# Patient Record
Sex: MALE | Race: WHITE | URBAN - METROPOLITAN AREA
[De-identification: names, ages, dates, MRNs, and addresses within clinical notes are randomized per-mention and may not be internally consistent; named-entity substitution may affect disease eponyms.]

---

## 2019-01-23 ENCOUNTER — HISTORICAL (OUTPATIENT)
Dept: RADIOLOGY | Facility: HOSPITAL | Age: 29
End: 2019-01-23

## 2019-01-29 ENCOUNTER — HISTORICAL (OUTPATIENT)
Dept: ADMINISTRATIVE | Facility: HOSPITAL | Age: 29
End: 2019-01-29

## 2020-07-30 ENCOUNTER — HISTORICAL (OUTPATIENT)
Dept: ADMINISTRATIVE | Facility: HOSPITAL | Age: 30
End: 2020-07-30

## 2020-07-30 LAB — SARS-COV-2 RNA RESP QL NAA+PROBE: NOT DETECTED

## 2021-06-16 ENCOUNTER — HISTORICAL (OUTPATIENT)
Dept: ADMINISTRATIVE | Facility: HOSPITAL | Age: 31
End: 2021-06-16

## 2021-07-01 ENCOUNTER — HISTORICAL (OUTPATIENT)
Dept: RADIOLOGY | Facility: HOSPITAL | Age: 31
End: 2021-07-01

## 2021-07-15 ENCOUNTER — HISTORICAL (OUTPATIENT)
Dept: ADMINISTRATIVE | Facility: HOSPITAL | Age: 31
End: 2021-07-15

## 2021-07-15 LAB — SARS-COV-2 AG RESP QL IA.RAPID: NEGATIVE

## 2021-07-16 ENCOUNTER — HISTORICAL (OUTPATIENT)
Dept: SURGERY | Facility: HOSPITAL | Age: 31
End: 2021-07-16

## 2021-08-23 ENCOUNTER — HISTORICAL (OUTPATIENT)
Dept: ADMINISTRATIVE | Facility: HOSPITAL | Age: 31
End: 2021-08-23

## 2021-08-23 LAB — SARS-COV-2 RNA RESP QL NAA+PROBE: NOT DETECTED

## 2021-09-27 ENCOUNTER — HISTORICAL (OUTPATIENT)
Dept: ADMINISTRATIVE | Facility: HOSPITAL | Age: 31
End: 2021-09-27

## 2021-12-27 ENCOUNTER — HISTORICAL (OUTPATIENT)
Dept: LAB | Facility: HOSPITAL | Age: 31
End: 2021-12-27

## 2022-04-11 ENCOUNTER — HISTORICAL (OUTPATIENT)
Dept: ADMINISTRATIVE | Facility: HOSPITAL | Age: 32
End: 2022-04-11

## 2022-04-24 VITALS
WEIGHT: 155.44 LBS | DIASTOLIC BLOOD PRESSURE: 52 MMHG | BODY MASS INDEX: 21.05 KG/M2 | SYSTOLIC BLOOD PRESSURE: 150 MMHG | HEIGHT: 72 IN

## 2022-04-30 NOTE — OP NOTE
DATE OF SURGERY:    01/29/2019    SURGEON:  Jun Vyas MD    PREOPERATIVE DIAGNOSIS:  Pilonidal cyst with abscess.    POSTOPERATIVE DIAGNOSIS:  Pilonidal cyst with abscess.    PROCEDURE:  Excision of pilonidal cyst and sinus, extensive.    ANESTHESIA:  General.    ESTIMATED BLOOD LOSS:  30 cc.    SPECIMENS:  None.    COMPLICATIONS:  None.    PROCEDURE IN DETAIL:  After informed consent was obtained, the patient was brought to the operating room and placed in a supine position.  Next, general endotracheal anesthesia was administered by a member of the anesthesia team.  The patient was placed in the prone jackknife position.  Buttocks were taped apart for exposure.  The perianal area and presacral area were prepped and draped in sterile surgical fashion.  The patient had a small right posterior lateral perianal opening with hypergranulation tissue.  A small Hill-Ch retractor was inserted into the rectum, and all 4 quadrants inspected.  No pathology was identified.  A lacrimal probe was then placed into the external opening; however, it did not track toward the rectum at all.  Peroxide was injected and again no internal opening or fistula was identified.  The opening was then probed towards the superior gluteal cleft and noted to communicate with a small midline pit.  The pit and fistulous opening were excised sharply with a 15 blade and electrocautery.  Dense scarring was noted within the base of the wound, as well as chronic granulation tissue.  The granulation tissue was debrided with a curette, and the scar tissue was excised with electrocautery.  The large right gluteal abscess was then incised laterally with a 15 blade.  Thin purulence was drained.  This was then injected with peroxide and noted to communicate with the superior aspect of the pilonidal wound.  A probe was then placed through this sinus tract, which was then opened with the scalpel and electrocautery as well.  Again, chronic scarring  and granulation tissue was encountered.  This was debrided and excised.  The lateral incision over the right buttock was extended and this abscess cavity was debrided with a curette in order to remove the chronic granulation tissue.  All wounds were irrigated with copious amounts of peroxide and saline.  Hemostasis was achieved with spot cautery.  A 1/4 inch Penrose drain was placed through the left lateral buttock incision through the abscess cavity and sinus tract.  It was tied to itself with 0 silk suture.  The abscess cavity as well as the midline pilonidal wound were packed with 2 inch Iodoform gauze and covered with a dry dressing.  The patient tolerated the procedure well and there were no complications.  He was returned to the supine position.  He was awakened and extubated in the operating room, then subsequently transferred to recovery in satisfactory condition.        ______________________________  Jun Vyas MD    MH/UD  DD:  01/29/2019  Time:  10:10AM  DT:  01/29/2019  Time:  10:32AM  Job #:  [id number]

## 2022-04-30 NOTE — OP NOTE
Patient:   Orestes Gabriel            MRN: [id number]            FIN: [id number]               Age:   30 years     Sex:  Male     :  1990   Associated Diagnoses:   None   Author:   Rudy Lima Jr, MD      Surgeon: Rudy Lima MD  PREOPERATIVE DIAGNOSIS: Right knee anterior cruciate ligament tear, medial meniscus tear, lateral meniscus tear  POSTOPERATIVE DIAGNOSIS: Right knee anterior cruciate ligament tear, medial and lateral meniscus tear  PROCEDURES: Right knee arthroscopic anterior cruciate ligament reconstruction with hamstring autograft, right knee arthroscopic medial and lateral meniscus repair  ASSISTANT: BREANN Lara.  Ms. Fu was essential in manipulating the leg, retraction, graft preparation and shuttling, and closure  COMPLICATIONS: None.   DISPOSITION: Home.   IMPLANT: Arthrex  HISTORY OF PRESENT ILLNESS: Orestes is a pleasant 30-year-old, who has had a multiple injuries to his knee with a subsequent feelings of instability.  Physical exam and MRI confirmed ACL tear.  We recommended reconstruction to prevent instability and pain. Risks, benefits and alternatives therapy were presented to the patient, and they elected to proceed.   PROCEDURE: Orestes was initially seen in the preoperative area where the history and physical were reviewed without changes.  Right lower extremity was marked. Consents were reviewed. All questions were answered. Anesthesia performed a preoperative block to the lower extremity. The patient was then transferred to the operating room, placed supine on the operating table where general anesthesia was induced. The right lower extremity was prepped and draped in sterile fashion. A nonsterile tourniquet was placed. It was insufflated to 300 mmHg and we began our procedure.   We made an vertical incision along the course of the hamstring tendons. We sharply dissected the skin and subcutaneous tissue. We lifted a medial L flap off the tibia and identified  the semitendinosus and gracilis tendons. We freed these of adhesions and harvested these consecutively using a closed tendon stripper. We then brought the tendons in the back table where we prepped them.   We initially cleaned the gracillis of excess muscle. We whipstitched the other end. We then transferred it to vancomycin soak gauge. We then measured the semitendinosus tendon. The semitendinosus tendon was stripped it of its muscle, and we again whipstitched the other end.  The semitendinosus was 320 mm so was able to triple this graft.  We then measured the diameter of the tendons and there were 9 mm. We then placed both tendons on the graft table and performed our #0 Vicryl whipstitch of the tendons. We marked the central portion of the tendon and then 35 mm to either side of the central carrie. We created a Saurabh sandal type stitch around the tendons. We then retensioned tendons on the back table, covered them with vancomycin soak gauze and returned to the arthroscopy portion.   We began the diagnostic arthroscopy. The patellofemoral joint was okay. There was no loose bodies or plicas. The medial and lateral gutters were clean. We then established an anterior medial portal using a spinal needle. The medial compartment had a no cartilage damage. The medial meniscus had a a vertical tear along the posterior horn.  I used a rasp in order to freshen the tear.  I then placed a suture across the tear in order to stabilize this.  I did this using an all inside technique.. . We then turned our attention to the lateral compartment. The lateral femoral condyle and plateau were without cartilage damage. The lateral meniscus again had a a vertical tear of the posterior horn.  This was just posterior to the popliteus.  I again placed a suture across the tear in order to repair it.  I again used a all inside technique.  We then turned out attention to the notch. The ACL was torn. We debrided the scarred ACL remnant with a  biter and shaver. We then began our tunnel preparation.   We used the accessory medial portal to access the lateral femoral condyle. We used a aiming guide to place our femoral pin. We drove this pin partially out of the lateral aspect of the knee. We then over reamed with an 9 mm partially threaded reamer to 25 mm. We then pulled the Beath pin out of the lateral aspect of the knee while shuttling a #5 permanent suture. We then turned our attention to the tibial tunnel. We used a elbow ACL guide placed at 55 degrees into the ACL tibial footprint. We placed our 3/32 pin and then reamed with a fully threaded 9.5 mm reamer. We cleaned the knee of all debris with a shaver. We then shuttled the shuttling suture from the femur down to the tibial tunnel. We then passed our graft up into the femur. We used a cortical button to secure the graft on the femoral side. We used a 10 mm soft tissue screw to secure the tibia.  I reinforced my tibia fixation using a swivel lock. The extraneaous tendon and sutures were removed. The wounds were copiously irrigated.   We closed the tibial incision layers starting with a #1 interrupted Vicryl on the hamstring flap. The subcutaneous tissue was closed with 2-0 Monocryl and the skin and portals were closed with 3-0 Monocryl suture.  A sterile dressing was applied.  A hinged knee brace was applied.  The patient was awoken unremarkably from anesthesia and transferred back to the postoperative unit.

## 2022-05-05 NOTE — HISTORICAL [facility] CERNER
This is a historical note converted from Radha. Formatting and pictures may have been removed.  Please reference Radha for original formatting and attached multimedia. Chief Complaint  right knee dislocation of patella DOI 2-25-21 then again 2 weeks ago..no prior surgery  History of Present Illness  He is a pleasant 31yo who presents with right knee pain and instability since February 2021. He had a subsequent dislocation in June 2021. He has associated swelling and pain that resolves after a few days. He feels some instability in his knee and wears a brace with some relief. Pain is located suprapatellar. He has tried OTC medications with some relief.? He is tried a home directed exercise program without relief since February 2021  Review of Systems  Comprehensive review of system was performed with no exceptions other than noted in the history of present illness  Physical Exam  Vitals & Measurements  T:?36.2? ?C (Oral)? HR:?75(Apical)? BP:?125/59?  HT:?182.00?cm? WT:?68.000?kg? BMI:?20.53?  Gen: WN, WD, NAD  Card/Res: NL breathing, +distal pulses  Abdomen: ND  Standing exam  stance: normal alignment, no significant leg-length discrepancy  gait:?normal  ?   Knee examination  - General comments: unremarkable appearance  ?   - Tenderness: suprapatellar  ?   Knee???????????????RIGHT??????????LEFT  Skin:??????????????? Intact ??????????Intact  ROM:??????????????? 0-130??????????0-130  Effusion:??????????? +???????????? Neg  MJL TTP:?????????? Neg?????????? ?? Neg  LJL TTP:????????????Neg?????????? ?? Neg  Turner:???????? ?Neg???????????? Neg  Pat crep:??????????? Neg???????????? Neg  Patella TTPs:????? Neg?????????????Neg  Patella grind:????? Neg??????????? ?Neg  Lachman:???????????+???????????? Neg  Pivot shift: ?????? ??Guards ? ? ? ? ? ?neg  Valgus stress:???? ?Neg??????? ???Neg  Varus stress:?????? Neg????????????Neg  Posterior drawer: Neg????????? ??Neg  ?   N-V????????????????intact?????????????  intact  Hip:?????????????????nml?????????????? nml  ?   Lower extremity edema:Negative??  Assessment/Plan  1.?ACL tear?S83.519A  ?Concern for ACL tear. ?Will get MRI to evaluate  Ordered:  Clinic Follow up, *Est. 06/23/21 3:00:00 CDT, Order for future visit, ACL tear, [facility]  MRI Ext Lower Joint Right W/O Contrast, Routine, 06/16/21 9:47:00 CDT, Other (please specify), Knee trauma, internal derangement suspected, xray done, None, Ambulatory, Patient Has IV?, Rad Type, Order for future visit, ACL tear, Schedule this test, [facility], 06/16/21 9:47:...  Office/Outpatient Visit Level 3 New [id number] PC, ACL tear, [facility], 06/16/21 9:47:00 CDT  ?  Orders:  XR Knee Right 4 or More Views, Routine, 06/16/21 8:57:00 CDT, None, Ambulatory, Patient Has IV?, Rad Type, Right knee dislocation, Not Scheduled, 06/16/21 8:57:00 CDT   Problem List/Past Medical History  Ongoing  Acute sprain or strain of thoracic region  Historical  No qualifying data  Procedure/Surgical History  Repair Upper Lip, External Approach (02/09/2020)  Simple repair of superficial wounds of face, ears, eyelids, nose, lips and/or mucous membranes; 2.5 cm or less (02/09/2020)  Excision of Buttock Subcutaneous Tissue and Fascia, Open Approach (01/29/2019)  Excision of pilonidal cyst or sinus; extensive (01/29/2019)  Incision & Drainage Major (.) (01/29/2019)   Medications  Mobic 7.5 mg oral tablet, 7.5 mg= 1 tab(s), Oral, BID, PRN,? ?Not Taking, Completed Rx: Last Dose Date/Time Unknown  Allergies  No Known Allergies  Social History  Abuse/Neglect  No, 06/16/2021  Alcohol - Denies Alcohol Use, 08/01/2014  Substance Use - Denies Substance Abuse, 08/01/2014  Tobacco - Denies Tobacco Use, 08/01/2014  10 or more cigarettes (1/2 pack or more)/day in last 30 days, No, 06/16/2021  Immunizations  Vaccine Date Status   tetanus/diphtheria/pertussis, acel(Tdap) 02/09/2020 Given   Health Maintenance  Health Maintenance  ???Pending?(in the  next year)  ??? ??OverDue  ??? ? ? ?TB Skin Test due??08/17/20??and every 1??year(s)  ??? ? ? ?Influenza Vaccine due??10/01/20??and every 1??day(s)  ??? ??Due?  ??? ? ? ?ADL Screening due??06/16/21??and every 1??year(s)  ??? ? ? ?Depression Screening due??06/16/21??Unknown Frequency  ??? ??Due In Future?  ??? ? ? ?Obesity Screening not due until??01/01/22??and every 1??year(s)  ??? ? ? ?Smoking Cessation not due until??01/01/22??and every 1??year(s)  ??? ? ? ?Alcohol Misuse Screening not due until??01/02/22??and every 1??year(s)  ???Satisfied?(in the past 1 year)  ??? ??Satisfied?  ??? ? ? ?Alcohol Misuse Screening on??06/16/21.??Satisfied by Christa Guerrero LPN  ??? ? ? ?Blood Pressure Screening on??06/16/21.??Satisfied by Christa Guerrero LPN  ??? ? ? ?Body Mass Index Check on??06/16/21.??Satisfied by Christa Guerrero LPN  ??? ? ? ?Obesity Screening on??06/16/21.??Satisfied by Christa Guerrero LPN  ??? ? ? ?Smoking Cessation on??06/16/21.??Satisfied by Christa Guerrero LPN  ?  Diagnostic Results  Knee radiographs show normal bony alignment

## 2022-05-05 NOTE — HISTORICAL [facility] CERNER
This is a historical note converted from Radha. Formatting and pictures may have been removed.  Please reference Radha for original formatting and attached multimedia. Chief Complaint  10 week s/p Rt knee ACL Recon, MM/LM rep here for eval and xrays. Doing great. ? sx 7/16/21 ?gl. 10/14/21.  History of Present Illness  7/16/2021: Right knee arthroscopic ACL reconstruction with hamstring autograft,?medial and lateral meniscus repair  ?   He returns today. He does not have any pain. ?He is working with therapy. ?Does have still some weakness around the knee.  Physical Exam  Vitals & Measurements  HR:?87(Peripheral)? BP:?150/52?  HT:?182.88?cm? WT:?70.500?kg? BMI:?21.08?  His incisions healed. ?Range of motion full. ?Expected quad atrophy. ?Stable ligamentously  Assessment/Plan  1.?S/P ACL reconstruction?Z98.890  ?Doing great status post above.? He can?begin straight line jogging and running. ?Continue strengthening.??He will be ready to get back to work?without restrictions on October 18.? I provided him with paperwork today. ?I will see him back in 3 months for range of motion check?and?plan ACL testing  Ordered:  Clinic Follow up, *Est. 12/27/21 3:00:00 CST, Order for future visit, S/P ACL reconstruction, [facility]  Post-Op follow-up visit [id number] PC, S/P ACL reconstruction, [facility], 09/27/21 9:36:00 CDT  XR Knee Right 3 Views, Routine, 09/27/21 9:24:00 CDT, None, Ambulatory, Patient Has IV?, Rad Type, S/P arthroscopic surgery of right knee, Not Scheduled, 09/27/21 9:24:00 CDT  ?  Referrals  Clinic Follow up, *Est. 12/27/21 3:00:00 CST, Order for future visit, S/P ACL reconstruction, [facility]   Problem List/Past Medical History  Ongoing  Acute sprain or strain of thoracic region  S/P ACL reconstruction  Historical  No qualifying data  Procedure/Surgical History  Arthroscopy Multi Ligament Knee Reconstruction (Right) (07/16/2021)  Arthroscopy, knee, surgical; with meniscus repair (medial AND  lateral) (07/16/2021)  Repair Right Knee Joint, Percutaneous Endoscopic Approach (07/16/2021)  Repair, primary, torn ligament and/or capsule, knee; cruciate (07/16/2021)  Replacement of Right Knee Bursa and Ligament with Autologous Tissue Substitute, Open Approach (07/16/2021)  Repair Upper Lip, External Approach (02/09/2020)  Simple repair of superficial wounds of face, ears, eyelids, nose, lips and/or mucous membranes; 2.5 cm or less (02/09/2020)  Excision of Buttock Subcutaneous Tissue and Fascia, Open Approach (01/29/2019)  Excision of pilonidal cyst or sinus; extensive (01/29/2019)  Incision & Drainage Major (.) (01/29/2019)  closed reduction lt wist   Medications  ketorolac, 30 mg= 1 mL, Intra-Articular, Once  Naropin 0.5% injectable solution, 150 mg= 30 mL, Intra-Articular, Once  Allergies  No Known Allergies  Social History  Abuse/Neglect  No, No, Yes, 09/27/2021  Alcohol - Denies Alcohol Use, 08/01/2014  Substance Use - Denies Substance Abuse, 08/01/2014  Tobacco - Denies Tobacco Use, 08/01/2014  10 or more cigarettes (1/2 pack or more)/day in last 30 days, No, 09/27/2021  Immunizations  Vaccine Date Status   tetanus/diphtheria/pertussis, acel(Tdap) 02/09/2020 Given   Health Maintenance  Health Maintenance  ???Pending?(in the next year)  ??? ??OverDue  ??? ? ? ?TB Skin Test due??08/17/20??and every 1??year(s)  ??? ??Due?  ??? ? ? ?ADL Screening due??09/27/21??and every 1??year(s)  ??? ? ? ?Depression Screening due??09/27/21??Unknown Frequency  ??? ??Due In Future?  ??? ? ? ?Obesity Screening not due until??01/01/22??and every 1??year(s)  ??? ? ? ?Smoking Cessation not due until??01/01/22??and every 1??year(s)  ??? ? ? ?Alcohol Misuse Screening not due until??01/02/22??and every 1??year(s)  ??? ? ? ?Blood Pressure Screening not due until??07/12/22??and every 1??year(s)  ??? ? ? ?Body Mass Index Check not due until??08/16/22??and every 1??year(s)  ???Satisfied?(in the past 1 year)  ??? ??Satisfied?  ??? ? ?  ?Alcohol Misuse Screening on??06/16/21.??Satisfied by Christa Guerrero LPN  ??? ? ? ?Blood Pressure Screening on??09/27/21.??Satisfied by Samantha Phillip  ??? ? ? ?Body Mass Index Check on??09/27/21.??Satisfied by Samantha Phillip L.  ??? ? ? ?Influenza Vaccine on??09/27/21.??Satisfied by Samantha Phillip L.  ??? ? ? ?Obesity Screening on??09/27/21.??Satisfied by Samantha Phillip LChino L.  ??? ? ? ?Smoking Cessation on??06/16/21.??Satisfied by Christa Guerrero LPN  ?  Diagnostic Results  Knee radiographs show appropriate implant position      Due to his quad atrophy and lack of strength, he has continued instability of his knee. I will provide him with an ACL brace today to stabilize his knee as he returns to work.

## 2024-06-20 ENCOUNTER — TELEPHONE (OUTPATIENT)
Dept: FAMILY MEDICINE | Facility: CLINIC | Age: 34
End: 2024-06-20

## 2024-06-20 ENCOUNTER — OFFICE VISIT (OUTPATIENT)
Dept: FAMILY MEDICINE | Facility: CLINIC | Age: 34
End: 2024-06-20
Payer: OTHER GOVERNMENT

## 2024-06-20 VITALS
OXYGEN SATURATION: 97 % | SYSTOLIC BLOOD PRESSURE: 144 MMHG | TEMPERATURE: 98 F | WEIGHT: 190.63 LBS | HEIGHT: 72 IN | RESPIRATION RATE: 18 BRPM | HEART RATE: 80 BPM | DIASTOLIC BLOOD PRESSURE: 98 MMHG | BODY MASS INDEX: 25.82 KG/M2

## 2024-06-20 DIAGNOSIS — Z00.00 ENCOUNTER FOR WELLNESS EXAMINATION: ICD-10-CM

## 2024-06-20 DIAGNOSIS — Z13.220 ENCOUNTER FOR LIPID SCREENING FOR CARDIOVASCULAR DISEASE: ICD-10-CM

## 2024-06-20 DIAGNOSIS — I10 PRIMARY HYPERTENSION: Primary | ICD-10-CM

## 2024-06-20 DIAGNOSIS — Z13.1 SCREENING FOR DIABETES MELLITUS: ICD-10-CM

## 2024-06-20 DIAGNOSIS — Z13.6 ENCOUNTER FOR LIPID SCREENING FOR CARDIOVASCULAR DISEASE: ICD-10-CM

## 2024-06-20 RX ORDER — VALSARTAN 40 MG/1
40 TABLET ORAL DAILY
Qty: 30 TABLET | Refills: 0 | Status: SHIPPED | OUTPATIENT
Start: 2024-06-20 | End: 2025-06-20

## 2024-06-20 RX ORDER — OLMESARTAN MEDOXOMIL 20 MG/1
20 TABLET ORAL DAILY
Qty: 30 TABLET | Refills: 0 | Status: SHIPPED | OUTPATIENT
Start: 2024-06-20 | End: 2024-06-20

## 2024-06-20 NOTE — TELEPHONE ENCOUNTER
Pts insurance will not cover the Olmesartan. Per Thelma pt needs to try one of the following meds first. Please advise.    Smart Rules : The preferred medications in the renin angiotensin antihypertensive (RAA) class are: Cozaar, Hyzaar, Diovan, Diovan HCT, Exforge, Exforge HCT, Micardis, Micardis HCT, Twynsta, Avalide and their generic equivalents. Has the patient had a trial of one preferred RAA agent and was unable to tolerate treatment due to adverse effects? -- PLEASE NOTE: the generic equivalents are: Cozaar = losartan. Hyzaar = losartan + HCTZ. Diovan = valsartan. Diovan HCT = valsartan + HCTZ. Exforge = amlodipine + valsartan. Exforge HCT = amlodipine + valsartan + HCTZ. Micardis = telmisartan. Micardis HCT = telmisartan + HCTZ. Twynsta = telmisartan + amlodipine. Avapro = irbesartan. Avalide = irbesartan + HCTZ. Atacand = candesartan. Atacand HCT = candesartan + HCTZ.

## 2024-06-20 NOTE — PROGRESS NOTES
Orestes Sanzmillion  06/20/2024  [id number]    Amalia Levy MD  Patient Care Team:  Amalia Levy MD as PCP - General (Family Medicine)      Chief Complaint:  Chief Complaint   Patient presents with    Establish Care     Needs PCP-pt state that he has elevated blood pressure at times       History of Present Illness:    33 y.o. male who presents today to establish care. He reports that he has been having elevated blood pressure readings for years. He is in the  and usually fluctuates into the 150's. Dad was diagnosed with htn in his 30's. He does have occasional palpitations. He is otherwise asymptomatic.     Review of Systems  General: denies f/c, weight loss, night sweats, decreased appetite  Eye: denies blurred vision, changes in vision  Respiratory: denies sob, wheezing, cough  Cardiovascular: denies chest pain, edema  Integumentary: denies rashes, pruritis    Past Medical History  History reviewed. No pertinent past medical history.    Medications  Medication List with Changes/Refills   New Medications    OLMESARTAN (BENICAR) 20 MG TABLET    Take 1 tablet (20 mg total) by mouth once daily.       Past Surgical History:   Procedure Laterality Date    KNEE ARTHROSCOPY      SURGICAL REMOVAL OF PILONIDAL CYST         SUBJECTIVE:  Health Maintenance  Health Maintenance Topics with due status: Not Due       Topic Last Completion Date    TETANUS VACCINE 02/09/2020     Health Maintenance Due   Topic Date Due    Hepatitis C Screening  Never done    Lipid Panel  Never done    HIV Screening  Never done    COVID-19 Vaccine (2 - 2023-24 season) 09/01/2023       Exam:  Vitals:    06/20/24 1348   BP: (!) 138/96   BP Location: Left arm   Patient Position: Sitting   BP Method: Large (Manual)   Pulse: 80   Resp: 18   Temp: 98 °F (36.7 °C)   TempSrc: Oral   SpO2: 97%   Weight: 86.5 kg (190 lb 9.6 oz)   Height: 6' (1.829 m)     Weight: 86.5 kg (190 lb 9.6 oz)   Body mass index is 25.85  kg/m².      Physical Exam  Constitutional: NAD, alert, pleasant  Respiratory: CTAB, no wheezes, rales or rhonchi. No accessory muscle use  Eyes: EOMI  Cardiovascular: RRR, No m/r/g. No JVD. No LE edema  Gastrointestinal: BS+, nontender, nondistended  Integumentary: warm, dry, intact  Psych: AA&Ox3      ICD-10-CM ICD-9-CM   1. Primary hypertension  I10 401.9   2. Encounter for wellness examination  Z00.00 V70.0   3. Screening for diabetes mellitus  Z13.1 V77.1   4. Encounter for lipid screening for cardiovascular disease  Z13.220 V77.91    Z13.6 V81.2       1. Primary hypertension  Overview:  Start olmesartan 20 mg daily  Recommend a low salt diet, weight loss and exercise  Avoid drinking too much caffeine  Take blood pressure medications 2-3 hours BEFORE every appointment so we can get an accurate reading in the office  Check your blood pressure twice a day and write it down. Bring blood pressure log and blood pressure cuff to next visit  Call me with pressure more than 140/90 or less than 100/60  Labs and EKG ordered  Rtc 3 wks    Orders:  -     olmesartan (BENICAR) 20 MG tablet; Take 1 tablet (20 mg total) by mouth once daily.  Dispense: 30 tablet; Refill: 0  -     CBC Auto Differential; Future; Expected date: 06/20/2024  -     Comprehensive Metabolic Panel; Future; Expected date: 06/20/2024  -     Lipid Panel; Future; Expected date: 06/20/2024  -     TSH; Future; Expected date: 06/20/2024  -     Hemoglobin A1C; Future; Expected date: 06/20/2024  -     Urinalysis; Future; Expected date: 06/20/2024  -     Hepatitis C Antibody; Future; Expected date: 06/20/2024  -     EKG 12-lead; Future; Expected date: 06/20/2024    2. Encounter for wellness examination  -     CBC Auto Differential; Future; Expected date: 06/20/2024  -     Comprehensive Metabolic Panel; Future; Expected date: 06/20/2024  -     Lipid Panel; Future; Expected date: 06/20/2024  -     TSH; Future; Expected date: 06/20/2024  -     Hemoglobin A1C; Future;  Expected date: 06/20/2024  -     Urinalysis; Future; Expected date: 06/20/2024  -     Hepatitis C Antibody; Future; Expected date: 06/20/2024    3. Screening for diabetes mellitus  -     Hemoglobin A1C; Future; Expected date: 06/20/2024    4. Encounter for lipid screening for cardiovascular disease  -     Lipid Panel; Future; Expected date: 06/20/2024         Follow up: Follow up for 3 wks wellness with labs.      Care Plan/Goals: Reviewed   Goals    None

## 2024-06-21 NOTE — TELEPHONE ENCOUNTER
LM on pts VM requesting a return call. Advised in my message that I would send him a message via the pt protal.

## 2024-07-03 ENCOUNTER — LAB VISIT (OUTPATIENT)
Dept: LAB | Facility: HOSPITAL | Age: 34
End: 2024-07-03
Attending: FAMILY MEDICINE
Payer: OTHER GOVERNMENT

## 2024-07-03 DIAGNOSIS — Z13.1 SCREENING FOR DIABETES MELLITUS: ICD-10-CM

## 2024-07-03 DIAGNOSIS — Z00.00 ENCOUNTER FOR WELLNESS EXAMINATION: ICD-10-CM

## 2024-07-03 DIAGNOSIS — I10 PRIMARY HYPERTENSION: ICD-10-CM

## 2024-07-03 DIAGNOSIS — Z13.220 ENCOUNTER FOR LIPID SCREENING FOR CARDIOVASCULAR DISEASE: ICD-10-CM

## 2024-07-03 DIAGNOSIS — Z13.6 ENCOUNTER FOR LIPID SCREENING FOR CARDIOVASCULAR DISEASE: ICD-10-CM

## 2024-07-03 LAB
ALBUMIN SERPL-MCNC: 4.3 G/DL (ref 3.5–5)
ALBUMIN/GLOB SERPL: 1.3 RATIO (ref 1.1–2)
ALP SERPL-CCNC: 94 UNIT/L (ref 40–150)
ALT SERPL-CCNC: 58 UNIT/L (ref 0–55)
ANION GAP SERPL CALC-SCNC: 9 MEQ/L
AST SERPL-CCNC: 40 UNIT/L (ref 5–34)
BACTERIA #/AREA URNS AUTO: ABNORMAL /HPF
BASOPHILS # BLD AUTO: 0.04 X10(3)/MCL
BASOPHILS NFR BLD AUTO: 0.5 %
BILIRUB SERPL-MCNC: 1.4 MG/DL
BILIRUB UR QL STRIP.AUTO: NEGATIVE
BUN SERPL-MCNC: 13.8 MG/DL (ref 8.9–20.6)
CALCIUM SERPL-MCNC: 9.9 MG/DL (ref 8.4–10.2)
CHLORIDE SERPL-SCNC: 105 MMOL/L (ref 98–107)
CLARITY UR: CLEAR
CO2 SERPL-SCNC: 24 MMOL/L (ref 22–29)
COLOR UR AUTO: YELLOW
CREAT SERPL-MCNC: 1.08 MG/DL (ref 0.73–1.18)
CREAT/UREA NIT SERPL: 13
EOSINOPHIL # BLD AUTO: 0.09 X10(3)/MCL (ref 0–0.9)
EOSINOPHIL NFR BLD AUTO: 1.1 %
ERYTHROCYTE [DISTWIDTH] IN BLOOD BY AUTOMATED COUNT: 12.2 % (ref 11.5–17)
EST. AVERAGE GLUCOSE BLD GHB EST-MCNC: 93.9 MG/DL
GFR SERPLBLD CREATININE-BSD FMLA CKD-EPI: >60 ML/MIN/1.73/M2
GLOBULIN SER-MCNC: 3.3 GM/DL (ref 2.4–3.5)
GLUCOSE SERPL-MCNC: 109 MG/DL (ref 74–100)
GLUCOSE UR QL STRIP: NORMAL
HBA1C MFR BLD: 4.9 %
HCT VFR BLD AUTO: 45.4 % (ref 42–52)
HGB BLD-MCNC: 15.9 G/DL (ref 14–18)
HGB UR QL STRIP: NEGATIVE
IMM GRANULOCYTES # BLD AUTO: 0.2 X10(3)/MCL (ref 0–0.04)
IMM GRANULOCYTES NFR BLD AUTO: 2.5 %
KETONES UR QL STRIP: ABNORMAL
LEUKOCYTE ESTERASE UR QL STRIP: NEGATIVE
LYMPHOCYTES # BLD AUTO: 2.35 X10(3)/MCL (ref 0.6–4.6)
LYMPHOCYTES NFR BLD AUTO: 29.6 %
MCH RBC QN AUTO: 30.2 PG (ref 27–31)
MCHC RBC AUTO-ENTMCNC: 35 G/DL (ref 33–36)
MCV RBC AUTO: 86.1 FL (ref 80–94)
MONOCYTES # BLD AUTO: 0.62 X10(3)/MCL (ref 0.1–1.3)
MONOCYTES NFR BLD AUTO: 7.8 %
MUCOUS THREADS URNS QL MICRO: ABNORMAL /LPF
NEUTROPHILS # BLD AUTO: 4.64 X10(3)/MCL (ref 2.1–9.2)
NEUTROPHILS NFR BLD AUTO: 58.5 %
NITRITE UR QL STRIP: NEGATIVE
NRBC BLD AUTO-RTO: 0 %
OHS QRS DURATION: 86 MS
OHS QTC CALCULATION: 416 MS
PH UR STRIP: 5.5 [PH]
PLATELET # BLD AUTO: 329 X10(3)/MCL (ref 130–400)
PMV BLD AUTO: 8.9 FL (ref 7.4–10.4)
POTASSIUM SERPL-SCNC: 4 MMOL/L (ref 3.5–5.1)
PROT SERPL-MCNC: 7.6 GM/DL (ref 6.4–8.3)
PROT UR QL STRIP: ABNORMAL
RBC # BLD AUTO: 5.27 X10(6)/MCL (ref 4.7–6.1)
RBC #/AREA URNS AUTO: ABNORMAL /HPF
SODIUM SERPL-SCNC: 138 MMOL/L (ref 136–145)
SP GR UR STRIP.AUTO: 1.03 (ref 1–1.03)
SQUAMOUS #/AREA URNS LPF: ABNORMAL /HPF
TSH SERPL-ACNC: 2.76 UIU/ML (ref 0.35–4.94)
UROBILINOGEN UR STRIP-ACNC: NORMAL
WBC # BLD AUTO: 7.94 X10(3)/MCL (ref 4.5–11.5)
WBC #/AREA URNS AUTO: ABNORMAL /HPF

## 2024-07-03 PROCEDURE — 84443 ASSAY THYROID STIM HORMONE: CPT

## 2024-07-03 PROCEDURE — 36415 COLL VENOUS BLD VENIPUNCTURE: CPT

## 2024-07-03 PROCEDURE — 86803 HEPATITIS C AB TEST: CPT

## 2024-07-03 PROCEDURE — 83036 HEMOGLOBIN GLYCOSYLATED A1C: CPT

## 2024-07-03 PROCEDURE — 81001 URINALYSIS AUTO W/SCOPE: CPT

## 2024-07-03 PROCEDURE — 93010 ELECTROCARDIOGRAM REPORT: CPT | Mod: ,,, | Performed by: INTERNAL MEDICINE

## 2024-07-03 PROCEDURE — 80053 COMPREHEN METABOLIC PANEL: CPT

## 2024-07-03 PROCEDURE — 85025 COMPLETE CBC W/AUTO DIFF WBC: CPT

## 2024-07-03 PROCEDURE — 93005 ELECTROCARDIOGRAM TRACING: CPT

## 2024-07-04 LAB — HCV AB SERPL QL IA: NONREACTIVE

## 2024-07-11 ENCOUNTER — OFFICE VISIT (OUTPATIENT)
Dept: FAMILY MEDICINE | Facility: CLINIC | Age: 34
End: 2024-07-11
Payer: OTHER GOVERNMENT

## 2024-07-11 VITALS
HEIGHT: 72 IN | HEART RATE: 74 BPM | DIASTOLIC BLOOD PRESSURE: 80 MMHG | BODY MASS INDEX: 25.64 KG/M2 | WEIGHT: 189.31 LBS | SYSTOLIC BLOOD PRESSURE: 119 MMHG | RESPIRATION RATE: 18 BRPM | OXYGEN SATURATION: 98 % | TEMPERATURE: 98 F

## 2024-07-11 DIAGNOSIS — Z00.00 ENCOUNTER FOR WELLNESS EXAMINATION: Primary | ICD-10-CM

## 2024-07-11 DIAGNOSIS — R80.9 PROTEINURIA, UNSPECIFIED TYPE: ICD-10-CM

## 2024-07-11 DIAGNOSIS — F17.290 VAPING NICOTINE DEPENDENCE, TOBACCO PRODUCT: ICD-10-CM

## 2024-07-11 DIAGNOSIS — Z11.4 ENCOUNTER FOR SCREENING FOR HIV: ICD-10-CM

## 2024-07-11 DIAGNOSIS — R74.01 TRANSAMINITIS: ICD-10-CM

## 2024-07-11 DIAGNOSIS — D72.828 GRANULOCYTOSIS: ICD-10-CM

## 2024-07-11 DIAGNOSIS — I10 PRIMARY HYPERTENSION: ICD-10-CM

## 2024-07-11 PROBLEM — R73.02 IMPAIRED GLUCOSE TOLERANCE: Status: ACTIVE | Noted: 2024-07-11

## 2024-07-11 PROBLEM — R73.02 IMPAIRED GLUCOSE TOLERANCE: Status: RESOLVED | Noted: 2024-07-11 | Resolved: 2024-07-11

## 2024-07-11 LAB
BACTERIA #/AREA URNS AUTO: ABNORMAL /HPF
BILIRUB UR QL STRIP.AUTO: NEGATIVE
CLARITY UR: CLEAR
COLOR UR AUTO: YELLOW
GLUCOSE UR QL STRIP: NORMAL
HGB UR QL STRIP: NEGATIVE
KETONES UR QL STRIP: NEGATIVE
LEUKOCYTE ESTERASE UR QL STRIP: NEGATIVE
MUCOUS THREADS URNS QL MICRO: ABNORMAL /LPF
NITRITE UR QL STRIP: NEGATIVE
PH UR STRIP: 6 [PH]
PROT UR QL STRIP: ABNORMAL
RBC #/AREA URNS AUTO: ABNORMAL /HPF
SP GR UR STRIP.AUTO: 1.03 (ref 1–1.03)
SQUAMOUS #/AREA URNS LPF: ABNORMAL /HPF
UROBILINOGEN UR STRIP-ACNC: NORMAL
WBC #/AREA URNS AUTO: ABNORMAL /HPF

## 2024-07-11 PROCEDURE — 81001 URINALYSIS AUTO W/SCOPE: CPT | Performed by: FAMILY MEDICINE

## 2024-07-11 RX ORDER — VALSARTAN 40 MG/1
40 TABLET ORAL DAILY
Qty: 90 TABLET | Refills: 3 | Status: SHIPPED | OUTPATIENT
Start: 2024-07-11 | End: 2025-07-11

## 2024-07-11 NOTE — PROGRESS NOTES
Please let patient know that he continues to have protein in urine. I have ordered an US of kidneys as well as a 24 hr urine. Please have him get container and explain how to do it

## 2024-07-11 NOTE — PROGRESS NOTES
Patient ID: [id number]     Chief Complaint: Annual Exam (Wellness with lab results)        HPI:     Orestes Gabriel is a 33 y.o. male here today for an annual wellness. Reviewed and discussed lab results. Overall he feels well. No other complaints today.     As a separate em visit, Labs reveal transaminitis and proteinuria. He rarely ever drinks alcohol. Bp is at goal. NO significant FH. Denies abdominal pain.         -------------------------------------    Hypertension        Past Surgical History:   Procedure Laterality Date    KNEE ARTHROSCOPY      SURGICAL REMOVAL OF PILONIDAL CYST         Review of patient's allergies indicates:  No Known Allergies    Outpatient Medications Marked as Taking for the 7/11/24 encounter (Office Visit) with Amalia Levy MD   Medication Sig Dispense Refill    [DISCONTINUED] valsartan (DIOVAN) 40 MG tablet Take 1 tablet (40 mg total) by mouth once daily. 30 tablet 0       Social History     Socioeconomic History    Marital status:    Tobacco Use    Smoking status: Every Day     Types: Vaping with nicotine    Smokeless tobacco: Never    Tobacco comments:     Quit smoking cigarettes about 3 years ago   Substance and Sexual Activity    Alcohol use: Yes    Drug use: Never    Sexual activity: Yes     Partners: Female     Birth control/protection: None     Comment: Wife is currently pregnant     Social Determinants of Health     Financial Resource Strain: Low Risk  (7/11/2024)    Overall Financial Resource Strain (CARDIA)     Difficulty of Paying Living Expenses: Not hard at all   Food Insecurity: No Food Insecurity (7/11/2024)    Hunger Vital Sign     Worried About Running Out of Food in the Last Year: Never true     Ran Out of Food in the Last Year: Never true   Transportation Needs: No Transportation Needs (7/11/2024)    TRANSPORTATION NEEDS     Transportation : No   Physical Activity: Insufficiently Active (7/11/2024)    Exercise Vital Sign     Days of  Exercise per Week: 2 days     Minutes of Exercise per Session: 60 min   Stress: No Stress Concern Present (6/17/2024)    [employer] [place] [employer] - Occupational Stress Questionnaire     Feeling of Stress : Not at all   Housing Stability: Unknown (7/11/2024)    Housing Stability Vital Sign     Unable to Pay for Housing in the Last Year: No        Family History   Problem Relation Name Age of Onset    Early death Mother Charis Gabriel     Heart disease Mother Charis Gabriel     Hypertension Mother Charis Gabriel     Kidney disease Mother Charis Gabriel     Stroke Mother Charis Gabriel     Hypertension Father Ayden Gabriel         Patient Care Team:  Amalia Levy MD as PCP - General (Family Medicine)     Subjective:     ROS    See HPI for details    Constitutional: Denies Change in appetite. Denies Chills. Denies Fever. Denies Night sweats.  Eye: Denies Blurred vision. Denies Discharge. Denies Eye pain.  ENT: Denies Decreased hearing. Denies Sore throat. Denies Swollen glands.  Respiratory: Denies Cough. Denies Shortness of breath. Denies Shortness of breath with exertion. Denies Wheezing.  Cardiovascular: DeniesChest pain at rest. Denies Chest pain with exertion. Denies Irregular heartbeat. Denies Palpitations. Denies Edema.  Gastrointestinal: Denies Abdominal pain. DeniesDiarrhea. Denies Nausea. Denies Vomiting. Denies Hematemesis or Hematochezia.  Genitourinary: Denies Dysuria. Denies Urinary frequency. Denies Urinary urgency. Denies Blood in urine.  Integumentary: Denies Rash. Denies Itching. Denies Dry skin.  Psychiatric: Denies Depression. Denies Anxiety. Denies Suicidal/Homicidal ideations.    All Other ROS: Negative except as stated in HPI.       Objective:     /80 (BP Location: Left arm, Patient Position: Sitting, BP Method: Large (Automatic))   Pulse 74   Temp 97.7 °F (36.5 °C) (Oral)   Resp 18   Ht 6' (1.829 m)   Wt 85.9 kg (189 lb 4.8 oz)    SpO2 98%   BMI 25.67 kg/m²     Physical Exam    General: Alert and oriented, No acute distress.  Head: Normocephalic, Atraumatic.  Eye: Pupils are equal, round and reactive to light, Extraocular movements are intact, Sclera non-icteric.  Ears/Nose/Throat: Tm+ light reflexes bilaterally. Normal, Mucosa moist,Clear. Teeth intact. NO lesions of tongue, palate, mucosa  Respiratory: Clear to auscultation bilaterally; No wheezes, rales or rhonchi,Non-labored respirations, Symmetrical chest wall expansion.  Cardiovascular: Regular rate and rhythm, S1/S2 normal, No murmurs, rubs or gallops.  Gastrointestinal: Soft, Non-tender, Non-distended, Normal bowel sounds, No palpable organomegaly.  Integumentary: Warm, Dry, Intact, No suspicious lesions or rashes.  Extremities: No clubbing, cyanosis or edema  Psychiatric: Normal interaction, Coherent speech, Euthymic mood, Appropriate affect       Assessment:       ICD-10-CM ICD-9-CM   1. Encounter for wellness examination  Z00.00 V70.0   2. Primary hypertension  I10 401.9   3. Transaminitis  R74.01 790.4   4. Vaping nicotine dependence, tobacco product  F17.290 305.1   5. Proteinuria, unspecified type  R80.9 791.0   6. Granulocytosis  D72.828 288.69        Plan:         Health Maintenance Topics with due status: Not Due       Topic Last Completion Date    TETANUS VACCINE 02/09/2020    Influenza Vaccine 10/12/2023        AAA Screening - screening for abdominal aneurysms if you have ever smoked a cigarette    Prostate Cancer Screening - starting at age 45 for  men and 50 if . Start sooner if father/brother with prostate cancer    Colon Cancer Screening -  recommended starting at age 45 unless a first degree relative has/had colon cancer then may be needed sooner    Eye Exam - Recommend annually.     Dental Exam - Recommend biannual exams.     Vaccinations -   Immunization History   Administered Date(s) Administered    COVID-19, vector-nr, rS-Ad26, PF  (Evi) 10/28/2021    DTP 06/23/1993, 09/08/1993, 12/13/1994    HIB 06/23/1993, 09/08/1993, 12/13/1994    Hepatitis B, Pediatric/Adolescent 06/23/1993, 09/08/1993, 12/13/1994    Influenza - Quadrivalent - PF *Preferred* (6 months and older) 01/31/2022, 10/12/2023    Influenza - Trivalent - PF (ADULT) 12/15/2014    OPV 06/23/1993, 09/08/1993    Tdap 02/09/2020      Patient was counseled on risks/benefits of receiving immunization. All questions were answered    Problem List Items Addressed This Visit          Cardiac/Vascular    Primary hypertension    Overview     Bp at goal on valsartan 40 mg daily. EKG normal. Asymptomatic.     Continue current Rx meds  Recommend a low salt diet, weight loss and exercise  Avoid drinking too much caffeine  Take blood pressure medications 2-3 hours BEFORE every appointment so we can get an accurate reading in the office  Check your blood pressure twice a day and write it down. Bring blood pressure log and blood pressure cuff to next visit  Call me with pressure more than 140/90 or less than 100/60           Relevant Medications    valsartan (DIOVAN) 40 MG tablet       Renal/    Proteinuria    Overview     Repeat UA today. If elevated, will get 24 urine and renal us         Relevant Orders    Urinalysis, Reflex to Urine Culture       Oncology    Granulocytosis    Overview     Repeat cbc in 3 mts            GI    Transaminitis    Overview     US abd ordered    Repeat cmp in 3 mts. If elevated will need work up         Relevant Orders    Comprehensive Metabolic Panel    Lipid Panel    CBC Auto Differential    US Abdomen Limited       Other    Vaping nicotine dependence, tobacco product    Overview     Cessation discussed          Other Visit Diagnoses       Encounter for wellness examination    -  Primary            Exercise for a total of 150 min per week and eat a healthy diet  Perform monthly self testicular exams to screen for testicular cancer  Stay up to date with all cancer  screening discussed in visit  Immunizations due were discussed during visit  All health maintenance was reviewed with patient. Patient verbalized understanding. All questions were answered.     Medication List with Changes/Refills   Changed and/or Refilled Medications    Modified Medication Previous Medication    VALSARTAN (DIOVAN) 40 MG TABLET valsartan (DIOVAN) 40 MG tablet       Take 1 tablet (40 mg total) by mouth once daily.    Take 1 tablet (40 mg total) by mouth once daily.       Start Date: 7/11/2024 End Date: 7/11/2025    Start Date: 6/20/2024 End Date: 7/11/2024          The patient's Health Maintenance was reviewed and the following appears to be due at this time:   Health Maintenance Due   Topic Date Due    Lipid Panel  Never done    HIV Screening  Never done    COVID-19 Vaccine (2 - 2023-24 season) 09/01/2023         No follow-ups on file. In addition to their scheduled follow up, the patient has also been instructed to follow up on as needed basis.

## 2024-07-28 ENCOUNTER — TELEPHONE (OUTPATIENT)
Dept: FAMILY MEDICINE | Facility: CLINIC | Age: 34
End: 2024-07-28
Payer: OTHER GOVERNMENT

## 2024-07-28 NOTE — TELEPHONE ENCOUNTER
Pt states that he needs a letter for the [facility]. He states that he had to notify them of the new diagnosis of HTN. He states that they are needing a letter stating that since starting the medication his b/p is controlled. Please advise.

## 2024-07-29 ENCOUNTER — PATIENT MESSAGE (OUTPATIENT)
Dept: FAMILY MEDICINE | Facility: CLINIC | Age: 34
End: 2024-07-29
Payer: OTHER GOVERNMENT

## 2024-10-14 ENCOUNTER — PATIENT MESSAGE (OUTPATIENT)
Dept: ADMINISTRATIVE | Facility: OTHER | Age: 34
End: 2024-10-14
Payer: OTHER GOVERNMENT

## 2024-10-23 ENCOUNTER — LAB VISIT (OUTPATIENT)
Dept: LAB | Facility: HOSPITAL | Age: 34
End: 2024-10-23
Attending: FAMILY MEDICINE
Payer: OTHER GOVERNMENT

## 2024-10-23 DIAGNOSIS — Z00.00 ROUTINE GENERAL MEDICAL EXAMINATION AT A HEALTH CARE FACILITY: ICD-10-CM

## 2024-10-23 DIAGNOSIS — Z00.00 ENCOUNTER FOR WELLNESS EXAMINATION: ICD-10-CM

## 2024-10-23 DIAGNOSIS — I10 ESSENTIAL HYPERTENSION, MALIGNANT: ICD-10-CM

## 2024-10-23 DIAGNOSIS — R80.9 PROTEINURIA: ICD-10-CM

## 2024-10-23 DIAGNOSIS — R80.9 PROTEINURIA, UNSPECIFIED TYPE: ICD-10-CM

## 2024-10-23 DIAGNOSIS — I10 PRIMARY HYPERTENSION: ICD-10-CM

## 2024-10-23 DIAGNOSIS — R74.01 TRANSAMINITIS: ICD-10-CM

## 2024-10-23 LAB
ALBUMIN/CREAT UR: 3.9 UG/MG (ref 0–30)
BILIRUB UR QL STRIP: NEGATIVE
CLARITY UR: ABNORMAL
COLOR UR: YELLOW
CREAT UR-MCNC: 129 MG/DL (ref 23–375)
GLUCOSE UR QL STRIP: NEGATIVE
HGB UR QL STRIP: NEGATIVE
KETONES UR QL STRIP: NEGATIVE
LEUKOCYTE ESTERASE UR QL STRIP: NEGATIVE
MICROALBUMIN UR DL<=1MG/L-MCNC: 5 UG/ML
NITRITE UR QL STRIP: NEGATIVE
PH UR STRIP: 8 [PH] (ref 5–8)
PROT UR QL STRIP: NEGATIVE
SP GR UR STRIP: 1.02 (ref 1–1.03)
URN SPEC COLLECT METH UR: ABNORMAL

## 2024-10-23 PROCEDURE — 81003 URINALYSIS AUTO W/O SCOPE: CPT | Mod: PO | Performed by: FAMILY MEDICINE

## 2024-10-23 PROCEDURE — 82570 ASSAY OF URINE CREATININE: CPT | Performed by: FAMILY MEDICINE

## 2024-10-24 ENCOUNTER — OFFICE VISIT (OUTPATIENT)
Dept: FAMILY MEDICINE | Facility: CLINIC | Age: 34
End: 2024-10-24
Payer: OTHER GOVERNMENT

## 2024-10-24 VITALS
HEART RATE: 67 BPM | WEIGHT: 189 LBS | RESPIRATION RATE: 18 BRPM | HEIGHT: 72 IN | BODY MASS INDEX: 25.6 KG/M2 | OXYGEN SATURATION: 98 % | DIASTOLIC BLOOD PRESSURE: 80 MMHG | SYSTOLIC BLOOD PRESSURE: 119 MMHG

## 2024-10-24 DIAGNOSIS — E78.2 MIXED HYPERLIPIDEMIA: ICD-10-CM

## 2024-10-24 DIAGNOSIS — D72.828 GRANULOCYTOSIS: ICD-10-CM

## 2024-10-24 DIAGNOSIS — I10 PRIMARY HYPERTENSION: Primary | ICD-10-CM

## 2024-10-24 PROCEDURE — 99214 OFFICE O/P EST MOD 30 MIN: CPT | Mod: ,,, | Performed by: FAMILY MEDICINE

## 2024-10-24 RX ORDER — VALSARTAN 40 MG/1
40 TABLET ORAL DAILY
Qty: 90 TABLET | Refills: 3 | Status: SHIPPED | OUTPATIENT
Start: 2024-10-24 | End: 2025-10-24

## 2024-10-24 NOTE — PROGRESS NOTES
Orestes Gabriel  10/24/2024  [id number]    Amalia Levy MD  Patient Care Team:  Amalia Levy MD as PCP - General (Family Medicine)      Chief Complaint:  Chief Complaint   Patient presents with    Follow-up     Follow up visit and needs a refill on blood pressure medication.        History of Present Illness:    34 y.o. male who presents today for htn f/u with labs. Labs reviewed by me and were discussed with patient. All questions regarding lab results were answered.     Labs reveal persistent granulocytosis. Also has hld.     Review of Systems  General: denies f/c, weight loss, night sweats, decreased appetite  Eye: denies blurred vision, changes in vision  Respiratory: denies sob, wheezing, cough  Cardiovascular: denies chest pain, palpitations, edema  Gastrointestinal: denies abdominal pain, n/v, constipation, diarrhea  Integumentary: denies rashes, pruritis    Past Medical History  Past Medical History:   Diagnosis Date    Hypertension        Medications  Medication List with Changes/Refills   Changed and/or Refilled Medications    Modified Medication Previous Medication    VALSARTAN (DIOVAN) 40 MG TABLET valsartan (DIOVAN) 40 MG tablet       Take 1 tablet (40 mg total) by mouth once daily.    Take 1 tablet (40 mg total) by mouth once daily.       Past Surgical History:   Procedure Laterality Date    KNEE ARTHROSCOPY      SURGICAL REMOVAL OF PILONIDAL CYST         SUBJECTIVE:  Health Maintenance  Health Maintenance Topics with due status: Not Due       Topic Last Completion Date    TETANUS VACCINE 02/09/2020    RSV Vaccine (Age 60+ and Pregnant patients) Not Due     Health Maintenance Due   Topic Date Due    HIV Screening  Never done    COVID-19 Vaccine (2 - 2024-25 season) 09/01/2024       Exam:  Vitals:    10/24/24 1428   BP: 119/80   BP Location: Right arm   Patient Position: Sitting   Pulse: 67   Resp: 18   SpO2: 98%   Weight: 85.7 kg (189 lb)   Height: 6' (1.829 m)     Weight:  85.7 kg (189 lb)   Body mass index is 25.63 kg/m².      Physical Exam  Constitutional: NAD, alert, pleasant  Respiratory: CTAB, no wheezes, rales or rhonchi. No accessory muscle use  Eyes: EOMI  Cardiovascular: RRR, No m/r/g. No JVD. No LE edema  Gastrointestinal: BS+, nontender, nondistended  Integumentary: warm, dry, intact  Psych: AA&Ox3      ICD-10-CM ICD-9-CM   1. Primary hypertension  I10 401.9   2. Granulocytosis  D72.828 288.69   3. Mixed hyperlipidemia  E78.2 272.2       1. Primary hypertension  Overview:  Bp at goal on valsartan 40 mg daily. EKG normal. Asymptomatic.     Continue current Rx meds  Recommend a low salt diet, weight loss and exercise  Avoid drinking too much caffeine  Take blood pressure medications 2-3 hours BEFORE every appointment so we can get an accurate reading in the office  Check your blood pressure twice a day and write it down. Bring blood pressure log and blood pressure cuff to next visit  Call me with pressure more than 140/90 or less than 100/60      Orders:  -     valsartan (DIOVAN) 40 MG tablet; Take 1 tablet (40 mg total) by mouth once daily.  Dispense: 90 tablet; Refill: 3    2. Granulocytosis  Overview:  Granulocytosis is persistent    Refer to hematology    Orders:  -     Ambulatory referral/consult to Hematology / Oncology; Future; Expected date: 10/24/2024    3. Mixed hyperlipidemia  Overview:  Eat a low fat diet. Such foods include baked/grilled/broiled fish/chicken, turkey, seafood, lean meats. Red meat is higher in fat.   Avoid fried, greasy foods  Continue to exercise for a total of 150 min per week     Will monitor  Start omega 3 fatty acids 1000 mg daily and otc niacin           Follow up: Follow up if symptoms worsen or fail to improve.      Care Plan/Goals: Reviewed   Goals    None

## 2024-10-31 ENCOUNTER — TELEPHONE (OUTPATIENT)
Dept: FAMILY MEDICINE | Facility: CLINIC | Age: 34
End: 2024-10-31

## 2024-10-31 NOTE — TELEPHONE ENCOUNTER
----- Message from Radha sent at 10/31/2024  2:50 PM CDT -----  Regarding: returning call  .Type:  Patient Returning Call    Who Called:Saloni devine/ NEENA    Who Left Message for Patient:Mora    Does the patient know what this is regarding?:referral    Would the patient rather a call back or a response via [facility]?     Best Call Back Number:[phone]  Additional Information: office is returning a call to Mora; please advise. Thanks.

## 2024-10-31 NOTE — TELEPHONE ENCOUNTER
TRACY Guallpa with [facility] to see if they accept pt insurance prior to me sending the referral there.

## 2024-10-31 NOTE — TELEPHONE ENCOUNTER
----- Message from Marilynn sent at 10/28/2024  7:46 AM CDT -----  Regarding: Referral Update  Hello,     I reached out to [facility] to check on the status of referral scheduling. Clinic informed me referral was denied by clinic. Reason: They are not in network with [facility]. Referral has been canceled.      Thank you,     Marilynn Baez  Access Navigator  [facility]   Referral Scheduling Department